# Patient Record
Sex: FEMALE | Race: BLACK OR AFRICAN AMERICAN | Employment: OTHER
[De-identification: names, ages, dates, MRNs, and addresses within clinical notes are randomized per-mention and may not be internally consistent; named-entity substitution may affect disease eponyms.]

---

## 2024-07-09 ENCOUNTER — HOSPITAL ENCOUNTER (OUTPATIENT)
Facility: HOSPITAL | Age: 74
Discharge: HOME | End: 2024-07-12
Attending: SURGERY
Payer: MEDICARE

## 2024-07-09 VITALS — WEIGHT: 190.04 LBS | BODY MASS INDEX: 32.44 KG/M2 | HEIGHT: 64 IN

## 2024-07-09 DIAGNOSIS — Z12.31 ENCOUNTER FOR SCREENING MAMMOGRAM FOR MALIGNANT NEOPLASM OF BREAST: ICD-10-CM

## 2024-07-09 PROCEDURE — 77063 BREAST TOMOSYNTHESIS BI: CPT

## 2024-11-15 ENCOUNTER — OFFICE VISIT (OUTPATIENT)
Age: 74
End: 2024-11-15
Payer: MEDICARE

## 2024-11-15 VITALS
HEART RATE: 67 BPM | HEIGHT: 64 IN | DIASTOLIC BLOOD PRESSURE: 71 MMHG | TEMPERATURE: 98.4 F | SYSTOLIC BLOOD PRESSURE: 116 MMHG | WEIGHT: 184.4 LBS | RESPIRATION RATE: 16 BRPM | BODY MASS INDEX: 31.48 KG/M2 | OXYGEN SATURATION: 97 %

## 2024-11-15 DIAGNOSIS — Z12.31 ENCOUNTER FOR SCREENING MAMMOGRAM FOR MALIGNANT NEOPLASM OF BREAST: ICD-10-CM

## 2024-11-15 DIAGNOSIS — D05.01 LOBULAR CARCINOMA IN SITU (LCIS) OF RIGHT BREAST: Primary | ICD-10-CM

## 2024-11-15 PROCEDURE — 1036F TOBACCO NON-USER: CPT | Performed by: SURGERY

## 2024-11-15 PROCEDURE — 1159F MED LIST DOCD IN RCRD: CPT | Performed by: SURGERY

## 2024-11-15 PROCEDURE — 1090F PRES/ABSN URINE INCON ASSESS: CPT | Performed by: SURGERY

## 2024-11-15 PROCEDURE — 3017F COLORECTAL CA SCREEN DOC REV: CPT | Performed by: SURGERY

## 2024-11-15 PROCEDURE — 1160F RVW MEDS BY RX/DR IN RCRD: CPT | Performed by: SURGERY

## 2024-11-15 PROCEDURE — 99213 OFFICE O/P EST LOW 20 MIN: CPT | Performed by: SURGERY

## 2024-11-15 PROCEDURE — G8400 PT W/DXA NO RESULTS DOC: HCPCS | Performed by: SURGERY

## 2024-11-15 PROCEDURE — 3074F SYST BP LT 130 MM HG: CPT | Performed by: SURGERY

## 2024-11-15 PROCEDURE — G8427 DOCREV CUR MEDS BY ELIG CLIN: HCPCS | Performed by: SURGERY

## 2024-11-15 PROCEDURE — 1123F ACP DISCUSS/DSCN MKR DOCD: CPT | Performed by: SURGERY

## 2024-11-15 PROCEDURE — G8484 FLU IMMUNIZE NO ADMIN: HCPCS | Performed by: SURGERY

## 2024-11-15 PROCEDURE — 1126F AMNT PAIN NOTED NONE PRSNT: CPT | Performed by: SURGERY

## 2024-11-15 PROCEDURE — 3078F DIAST BP <80 MM HG: CPT | Performed by: SURGERY

## 2024-11-15 PROCEDURE — G8417 CALC BMI ABV UP PARAM F/U: HCPCS | Performed by: SURGERY

## 2024-11-15 ASSESSMENT — PATIENT HEALTH QUESTIONNAIRE - PHQ9
SUM OF ALL RESPONSES TO PHQ9 QUESTIONS 1 & 2: 0
2. FEELING DOWN, DEPRESSED OR HOPELESS: NOT AT ALL
SUM OF ALL RESPONSES TO PHQ QUESTIONS 1-9: 0
1. LITTLE INTEREST OR PLEASURE IN DOING THINGS: NOT AT ALL
SUM OF ALL RESPONSES TO PHQ QUESTIONS 1-9: 0

## 2024-11-15 ASSESSMENT — ENCOUNTER SYMPTOMS
SORE THROAT: 0
STRIDOR: 0
BACK PAIN: 0
COUGH: 0
EYE PAIN: 0
DIARRHEA: 0
SHORTNESS OF BREATH: 0
ABDOMINAL PAIN: 0
CONSTIPATION: 0
APNEA: 1
WHEEZING: 0
BLOOD IN STOOL: 0
VOMITING: 0
NAUSEA: 0

## 2024-11-15 NOTE — PROGRESS NOTES
Identified pt with two pt identifiers (name and ). Reviewed chart in preparation for visit and have obtained necessary documentation.    Lazara Khoury is a 74 y.o. female Follow-up (Left breast papilloma)  .    Vitals:    11/15/24 1001   BP: 116/71   Site: Left Upper Arm   Position: Sitting   Pulse: 67   Resp: 16   Temp: 98.4 °F (36.9 °C)   TempSrc: Oral   SpO2: 97%   Weight: 83.6 kg (184 lb 6.4 oz)   Height: 1.626 m (5' 4\")          1. Have you been to the ER, urgent care clinic since your last visit?  Hospitalized since your last visit?  no     2. Have you seen or consulted any other health care providers outside of the [facility] since your last visit?  Include any pap smears or colon screening.  No        Lymphedema Measurements:        []Right Arm  [x]Left Arm     Wrist:      __16.5_____cm   (Measurement taken at the bend of the Wrist)     Forearm: ___18____cm    (Measurement taken ___6___ cm from the Wrist)     Bicep:     _32_______cm   (Measurement taken ___6____cm from the AC)               Lymphedema Measurements:        [x]Right Arm  []Left Arm     Wrist:      ____16__cm   (Measurement taken at the bend of the Wrist)     Forearm: __18.5_____cm    (Measurement taken ___6___ cm from the Wrist)     Bicep:     __31______cm   (Measurement taken ___6____cm from the AC)

## 2024-11-15 NOTE — PROGRESS NOTES
Subjective:      Patient ID: Lazara Khoury is a 74 y.o. female who comes in for follow up for LCIS and papillomas      Chief Complaint   Patient presents with    Follow-up     Left breast papilloma       HPI     She developed a ingrown hair in the right suprapubic region in June 2020.  She was treated with antibiotics and the inflammation improved but she continues to have discomfort in the groin region.  She also feels she has enlarged \"glands\".   She denies  fever, chills, sweats, nausea, vomiting, diarrhea, constipation, melena or hematochezia.  US 6/25/2020 revealed a 10.4 x 4.1 x 3.7 cm complex avascular cystic process. She also reports bloating and abdominal pain.  Subsequent work up revealed a reaction  from a hip prosthesis and she has since had a revision by Dr Romo.   She now reports some right groin swelling again and recent US 2/9/2021 demonstrated some mildly enlarged lymph nodes to 1.4 cm with normal architecture.   She area feels better.    She denies skin changes, fevers, chills or sweats.              I excised some LCIS in the past and she had a screening MRI in Feb 2016 and a subsequent biopsy revealed a papilloma (at least 5 mm) in size at 0600 in the left breast.  She has had multiple right breast biopsies in the past.   In Feb 2006 I excised a  papilloma.  She denies feeling any masses, skin changes, nipple changes or bloody drainage, unexplained weight loss or bone pain.  She had menarche at 11, first of two pregnancies at 25, menopause at 49 and has been taking HRT for the last 15 years.    She had a MA with post menopausal breast cancer (60s),  breast ca at 49 (same family as MA).  She tried breast cancer prevention therapy but did not tolerate it.  Mammogram 7/9/2024 was BIRADS 2.  Last MRI 8/18/2018 was BIRADS 2.    Past Medical History:   Diagnosis Date    Arrhythmia 2018    hx of atrial flutter?    Arthritis     Breast cancer (HCC) 07/10/2015    LCIS. No radiation and or

## 2025-07-11 ENCOUNTER — HOSPITAL ENCOUNTER (OUTPATIENT)
Facility: HOSPITAL | Age: 75
Discharge: HOME | End: 2025-07-14
Attending: SURGERY
Payer: MEDICARE

## 2025-07-11 DIAGNOSIS — Z12.31 ENCOUNTER FOR SCREENING MAMMOGRAM FOR MALIGNANT NEOPLASM OF BREAST: ICD-10-CM

## 2025-07-11 PROCEDURE — 77063 BREAST TOMOSYNTHESIS BI: CPT
